# Patient Record
Sex: FEMALE | Race: WHITE | URBAN - METROPOLITAN AREA
[De-identification: names, ages, dates, MRNs, and addresses within clinical notes are randomized per-mention and may not be internally consistent; named-entity substitution may affect disease eponyms.]

---

## 2018-10-25 DIAGNOSIS — Z12.31 ENCOUNTER FOR SCREENING MAMMOGRAM FOR MALIGNANT NEOPLASM OF BREAST: Primary | ICD-10-CM

## 2019-09-27 ENCOUNTER — OFFICE VISIT (OUTPATIENT)
Dept: FAMILY MEDICINE CLINIC | Facility: CLINIC | Age: 64
End: 2019-09-27
Payer: COMMERCIAL

## 2019-09-27 VITALS
BODY MASS INDEX: 37.22 KG/M2 | HEIGHT: 64 IN | OXYGEN SATURATION: 96 % | WEIGHT: 218 LBS | HEART RATE: 83 BPM | DIASTOLIC BLOOD PRESSURE: 72 MMHG | RESPIRATION RATE: 22 BRPM | TEMPERATURE: 98 F | SYSTOLIC BLOOD PRESSURE: 140 MMHG

## 2019-09-27 DIAGNOSIS — R05.9 COUGH IN ADULT: Primary | ICD-10-CM

## 2019-09-27 DIAGNOSIS — J06.9 VIRAL URI: ICD-10-CM

## 2019-09-27 DIAGNOSIS — Z87.891 FORMER SMOKER: ICD-10-CM

## 2019-09-27 PROCEDURE — 99214 OFFICE O/P EST MOD 30 MIN: CPT | Performed by: NURSE PRACTITIONER

## 2019-09-27 PROCEDURE — 94640 AIRWAY INHALATION TREATMENT: CPT | Performed by: NURSE PRACTITIONER

## 2019-09-27 RX ORDER — ALBUTEROL SULFATE 90 UG/1
1 AEROSOL, METERED RESPIRATORY (INHALATION) EVERY 6 HOURS PRN
Qty: 1 INHALER | Refills: 0 | Status: SHIPPED | OUTPATIENT
Start: 2019-09-27 | End: 2019-10-07

## 2019-09-27 RX ORDER — ALBUTEROL SULFATE 2.5 MG/3ML
2.5 SOLUTION RESPIRATORY (INHALATION) ONCE
Status: COMPLETED | OUTPATIENT
Start: 2019-09-27 | End: 2019-09-27

## 2019-09-27 RX ADMIN — ALBUTEROL SULFATE 2.5 MG: 2.5 SOLUTION RESPIRATORY (INHALATION) at 11:52:00

## 2019-09-27 NOTE — PROGRESS NOTES
HPI:   Sonia Don is a 62year old female who presents with ill symptoms for  5  days. Patient reports symptoms began as sore throat, post nasal drip and has become worsening cough.  Cough is keeping awake at night, dry during day but sometimes produ /72   Pulse 83   Temp 97.5 °F (36.4 °C) (Oral)   Resp 22   Ht 64\"   Wt 218 lb   LMP  (LMP Unknown)   SpO2 96%   Breastfeeding?  No   BMI 37.42 kg/m²   GENERAL: well developed, well nourished,in no apparent distress, mildly dyspneic with speech and na · Please use Albuterol inhaler 1-2 puffs every 6 hours as needed. Next dose no sooner than 6 pm.  · Use Mucinex as discussed to thin mucus. Salt water gargles (1 tsp.  Salt in 6 oz lukewarm water), use several times daily to help decrease swelling and pain Bronchitis usually lasts 7 to 14 days. The wheezing should improve with treatment during the first week. An inhaler is often prescribed to relax the air passages and stop wheezing.  Antibiotics will be prescribed if your doctor thinks there is also a second Follow up with your healthcare provider, or as advised. If you had an X-ray or ECG (electrocardiogram), a specialist will review it. You will be notified of any new findings that may affect your care.   If you are age 72 or older, or if you have a chronic l

## 2019-09-27 NOTE — PATIENT INSTRUCTIONS
· Please use Albuterol inhaler 1-2 puffs every 6 hours as needed. Next dose no sooner than 6 pm.  · Use Mucinex as discussed to thin mucus. Salt water gargles (1 tsp.  Salt in 6 oz lukewarm water), use several times daily to help decrease swelling and pain air passages and stop wheezing. Antibiotics will be prescribed if your doctor thinks there is also a secondary bacterial infection. Home care  · If symptoms are severe, rest at home for the first 2 to 3 days.  When you go back to your usual activities, don immune system, or you smoke, ask your healthcare provider about getting a pneumococcal vaccine and a yearly flu shot (influenza vaccine).   When to seek medical advice  Call your healthcare provider right away if any of these occur:  · Fever of 100.4°F (38°

## 2022-01-16 ENCOUNTER — WALK IN (OUTPATIENT)
Dept: URGENT CARE | Age: 67
End: 2022-01-16

## 2022-01-16 VITALS
BODY MASS INDEX: 36.37 KG/M2 | HEIGHT: 64 IN | HEART RATE: 80 BPM | WEIGHT: 213 LBS | SYSTOLIC BLOOD PRESSURE: 130 MMHG | DIASTOLIC BLOOD PRESSURE: 88 MMHG | TEMPERATURE: 97.5 F | RESPIRATION RATE: 16 BRPM | OXYGEN SATURATION: 97 %

## 2022-01-16 DIAGNOSIS — H65.92 FLUID LEVEL BEHIND TYMPANIC MEMBRANE OF LEFT EAR: Primary | ICD-10-CM

## 2022-01-16 PROCEDURE — 99202 OFFICE O/P NEW SF 15 MIN: CPT | Performed by: NURSE PRACTITIONER

## 2022-01-16 RX ORDER — LEVOTHYROXINE SODIUM 0.12 MG/1
TABLET ORAL
COMMUNITY

## 2022-01-16 RX ORDER — FLUTICASONE PROPIONATE 50 MCG
2 SPRAY, SUSPENSION (ML) NASAL DAILY
Qty: 1 EACH | Refills: 0 | Status: SHIPPED | OUTPATIENT
Start: 2022-01-16 | End: 2022-01-23

## 2022-01-16 ASSESSMENT — ENCOUNTER SYMPTOMS
HEADACHES: 0
VOMITING: 0
SORE THROAT: 0
SINUS PAIN: 0
COUGH: 0
RHINORRHEA: 0
CONSTITUTIONAL NEGATIVE: 1
DIARRHEA: 0
EYES NEGATIVE: 1
SINUS PRESSURE: 0

## (undated) NOTE — LETTER
Date: 9/27/2019    Patient Name: Alexandria Her          To Whom it may concern: The above patient was seen at the [facility] for treatment of a medical condition. This patient should be excused from attending work through 9/30/19.